# Patient Record
Sex: FEMALE | NOT HISPANIC OR LATINO | URBAN - METROPOLITAN AREA
[De-identification: names, ages, dates, MRNs, and addresses within clinical notes are randomized per-mention and may not be internally consistent; named-entity substitution may affect disease eponyms.]

---

## 2018-05-03 ENCOUNTER — APPOINTMENT (RX ONLY)
Dept: URBAN - METROPOLITAN AREA CLINIC 368 | Facility: CLINIC | Age: 28
Setting detail: DERMATOLOGY
End: 2018-05-03

## 2018-05-03 DIAGNOSIS — L21.8 OTHER SEBORRHEIC DERMATITIS: ICD-10-CM

## 2018-05-03 DIAGNOSIS — L81.4 OTHER MELANIN HYPERPIGMENTATION: ICD-10-CM

## 2018-05-03 DIAGNOSIS — Z41.9 ENCOUNTER FOR PROCEDURE FOR PURPOSES OTHER THAN REMEDYING HEALTH STATE, UNSPECIFIED: ICD-10-CM

## 2018-05-03 DIAGNOSIS — L20.89 OTHER ATOPIC DERMATITIS: ICD-10-CM

## 2018-05-03 PROBLEM — L20.84 INTRINSIC (ALLERGIC) ECZEMA: Status: ACTIVE | Noted: 2018-05-03

## 2018-05-03 PROCEDURE — ? TREATMENT REGIMEN

## 2018-05-03 PROCEDURE — ? COUNSELING

## 2018-05-03 PROCEDURE — ? PRESCRIPTION

## 2018-05-03 PROCEDURE — 99202 OFFICE O/P NEW SF 15 MIN: CPT

## 2018-05-03 PROCEDURE — ? MEDICAL CONSULTATION: LHR

## 2018-05-03 RX ORDER — EMOLLIENT COMBINATION NO.53
LOTION (ML) TOPICAL
Qty: 1 | Refills: 3 | Status: ERX

## 2018-05-03 RX ORDER — SALICYLIC ACID 60 MG/G
CREAM TOPICAL
Qty: 1 | Refills: 2 | Status: ERX | COMMUNITY
Start: 2018-05-03

## 2018-05-03 RX ORDER — FLUOCINOLONE ACETONIDE 0.11 MG/ML
OIL TOPICAL
Qty: 1 | Refills: 2 | Status: ERX

## 2018-05-03 RX ORDER — EMOLLIENT COMBINATION NO.53
LOTION (ML) TOPICAL
Qty: 1 | Refills: 3 | Status: ERX | COMMUNITY
Start: 2018-05-03

## 2018-05-03 RX ORDER — FLUOCINOLONE ACETONIDE 0.11 MG/ML
OIL TOPICAL
Qty: 1 | Refills: 2 | Status: ERX | COMMUNITY
Start: 2018-05-03

## 2018-05-03 RX ADMIN — Medication: at 16:01

## 2018-05-03 RX ADMIN — FLUOCINOLONE ACETONIDE: 0.11 OIL TOPICAL at 15:58

## 2018-05-03 RX ADMIN — SALICYLIC ACID: 60 CREAM TOPICAL at 15:59

## 2018-05-03 ASSESSMENT — LOCATION DETAILED DESCRIPTION DERM
LOCATION DETAILED: RIGHT MEDIAL MALAR CHEEK
LOCATION DETAILED: LEFT MEDIAL MALAR CHEEK
LOCATION DETAILED: RIGHT ANTERIOR SHOULDER
LOCATION DETAILED: LEFT INFERIOR ANTERIOR NECK
LOCATION DETAILED: LEFT RIB CAGE
LOCATION DETAILED: LEFT ANTERIOR SHOULDER
LOCATION DETAILED: RIGHT INFERIOR LATERAL NECK
LOCATION DETAILED: RIGHT RIB CAGE
LOCATION DETAILED: MID-FRONTAL SCALP
LOCATION DETAILED: RIGHT LOWER CUTANEOUS LIP

## 2018-05-03 ASSESSMENT — LOCATION SIMPLE DESCRIPTION DERM
LOCATION SIMPLE: ANTERIOR SCALP
LOCATION SIMPLE: RIGHT LIP
LOCATION SIMPLE: LEFT ANTERIOR NECK
LOCATION SIMPLE: LEFT CHEEK
LOCATION SIMPLE: RIGHT ANTERIOR NECK
LOCATION SIMPLE: ABDOMEN
LOCATION SIMPLE: RIGHT SHOULDER
LOCATION SIMPLE: LEFT SHOULDER
LOCATION SIMPLE: RIGHT CHEEK

## 2018-05-03 ASSESSMENT — LOCATION ZONE DERM
LOCATION ZONE: TRUNK
LOCATION ZONE: NECK
LOCATION ZONE: ARM
LOCATION ZONE: FACE
LOCATION ZONE: LIP
LOCATION ZONE: SCALP

## 2018-05-03 NOTE — PROCEDURE: TREATMENT REGIMEN
Plan: Apply a liberal application to scalp the night before hair washing\\n\\nTry Reji Velazquez tea tree shampoo to help with dryness
Plan: Apply 6% salicylic acid cream once daily to the areas of hyperpigmentation, followed by the 8% BrandMD bleaching cream \\n\\nWear SPF this summer
Detail Level: Zone

## 2018-08-01 ENCOUNTER — APPOINTMENT (RX ONLY)
Dept: URBAN - METROPOLITAN AREA CLINIC 368 | Facility: CLINIC | Age: 28
Setting detail: DERMATOLOGY
End: 2018-08-01

## 2018-08-01 DIAGNOSIS — L20.89 OTHER ATOPIC DERMATITIS: ICD-10-CM

## 2018-08-01 DIAGNOSIS — L81.4 OTHER MELANIN HYPERPIGMENTATION: ICD-10-CM

## 2018-08-01 DIAGNOSIS — L21.8 OTHER SEBORRHEIC DERMATITIS: ICD-10-CM

## 2018-08-01 PROBLEM — L29.8 OTHER PRURITUS: Status: ACTIVE | Noted: 2018-08-01

## 2018-08-01 PROBLEM — L20.84 INTRINSIC (ALLERGIC) ECZEMA: Status: ACTIVE | Noted: 2018-08-01

## 2018-08-01 PROCEDURE — ? TREATMENT REGIMEN

## 2018-08-01 PROCEDURE — ? PRESCRIPTION

## 2018-08-01 PROCEDURE — ? COUNSELING

## 2018-08-01 PROCEDURE — 99213 OFFICE O/P EST LOW 20 MIN: CPT

## 2018-08-01 RX ORDER — SELENIUM SULFIDE 23 MG/ML
SHAMPOO TOPICAL
Qty: 2 | Refills: 3 | Status: ERX | COMMUNITY
Start: 2018-08-01

## 2018-08-01 RX ORDER — HYDROCORTISONE ACETATE, IODOQUINOL 19; 10 MG/G; MG/G
CREAM TOPICAL
Qty: 1 | Refills: 1 | Status: ERX | COMMUNITY
Start: 2018-08-01

## 2018-08-01 RX ADMIN — HYDROCORTISONE ACETATE, IODOQUINOL: 19; 10 CREAM TOPICAL at 13:17

## 2018-08-01 RX ADMIN — SELENIUM SULFIDE: 23 SHAMPOO TOPICAL at 13:18

## 2018-08-01 ASSESSMENT — LOCATION DETAILED DESCRIPTION DERM: LOCATION DETAILED: MID-FRONTAL SCALP

## 2018-08-01 ASSESSMENT — LOCATION SIMPLE DESCRIPTION DERM: LOCATION SIMPLE: ANTERIOR SCALP

## 2018-08-01 ASSESSMENT — LOCATION ZONE DERM: LOCATION ZONE: SCALP
